# Patient Record
Sex: MALE | Race: OTHER | HISPANIC OR LATINO | URBAN - METROPOLITAN AREA
[De-identification: names, ages, dates, MRNs, and addresses within clinical notes are randomized per-mention and may not be internally consistent; named-entity substitution may affect disease eponyms.]

---

## 2023-05-06 ENCOUNTER — EMERGENCY (EMERGENCY)
Facility: HOSPITAL | Age: 28
LOS: 1 days | Discharge: HOME | End: 2023-05-06
Attending: EMERGENCY MEDICINE | Admitting: EMERGENCY MEDICINE
Payer: SELF-PAY

## 2023-05-06 VITALS
OXYGEN SATURATION: 97 % | RESPIRATION RATE: 16 BRPM | SYSTOLIC BLOOD PRESSURE: 105 MMHG | TEMPERATURE: 98 F | DIASTOLIC BLOOD PRESSURE: 77 MMHG

## 2023-05-06 VITALS
SYSTOLIC BLOOD PRESSURE: 125 MMHG | DIASTOLIC BLOOD PRESSURE: 81 MMHG | RESPIRATION RATE: 16 BRPM | TEMPERATURE: 97 F | HEART RATE: 76 BPM | OXYGEN SATURATION: 94 %

## 2023-05-06 DIAGNOSIS — R41.82 ALTERED MENTAL STATUS, UNSPECIFIED: ICD-10-CM

## 2023-05-06 DIAGNOSIS — F10.129 ALCOHOL ABUSE WITH INTOXICATION, UNSPECIFIED: ICD-10-CM
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
## 2023-05-06 LAB
ALBUMIN SERPL ELPH-MCNC: 4.1 G/DL — SIGNIFICANT CHANGE UP (ref 3.4–5)
ALP SERPL-CCNC: 74 U/L — SIGNIFICANT CHANGE UP (ref 40–120)
ALT FLD-CCNC: 61 U/L — HIGH (ref 12–42)
ANION GAP SERPL CALC-SCNC: 15 MMOL/L — SIGNIFICANT CHANGE UP (ref 9–16)
AST SERPL-CCNC: 48 U/L — HIGH (ref 15–37)
BILIRUB SERPL-MCNC: 0.3 MG/DL — SIGNIFICANT CHANGE UP (ref 0.2–1.2)
BUN SERPL-MCNC: 13 MG/DL — SIGNIFICANT CHANGE UP (ref 7–23)
CALCIUM SERPL-MCNC: 8.5 MG/DL — SIGNIFICANT CHANGE UP (ref 8.5–10.5)
CHLORIDE SERPL-SCNC: 104 MMOL/L — SIGNIFICANT CHANGE UP (ref 96–108)
CO2 SERPL-SCNC: 19 MMOL/L — LOW (ref 22–31)
CREAT SERPL-MCNC: 0.74 MG/DL — SIGNIFICANT CHANGE UP (ref 0.5–1.3)
EGFR: 123 ML/MIN/1.73M2 — SIGNIFICANT CHANGE UP
ETHANOL SERPL-MCNC: 601 MG/DL — HIGH
GLUCOSE SERPL-MCNC: 136 MG/DL — HIGH (ref 70–99)
HCT VFR BLD CALC: 44.7 % — SIGNIFICANT CHANGE UP (ref 39–50)
HGB BLD-MCNC: 15 G/DL — SIGNIFICANT CHANGE UP (ref 13–17)
MAGNESIUM SERPL-MCNC: 2.2 MG/DL — SIGNIFICANT CHANGE UP (ref 1.6–2.6)
MCHC RBC-ENTMCNC: 28.6 PG — SIGNIFICANT CHANGE UP (ref 27–34)
MCHC RBC-ENTMCNC: 33.6 GM/DL — SIGNIFICANT CHANGE UP (ref 32–36)
MCV RBC AUTO: 85.1 FL — SIGNIFICANT CHANGE UP (ref 80–100)
NRBC # BLD: 0 /100 WBCS — SIGNIFICANT CHANGE UP (ref 0–0)
PLATELET # BLD AUTO: 192 K/UL — SIGNIFICANT CHANGE UP (ref 150–400)
POTASSIUM SERPL-MCNC: 3.3 MMOL/L — LOW (ref 3.5–5.3)
POTASSIUM SERPL-SCNC: 3.3 MMOL/L — LOW (ref 3.5–5.3)
PROT SERPL-MCNC: 8.4 G/DL — HIGH (ref 6.4–8.2)
RBC # BLD: 5.25 M/UL — SIGNIFICANT CHANGE UP (ref 4.2–5.8)
RBC # FLD: 12.3 % — SIGNIFICANT CHANGE UP (ref 10.3–14.5)
SODIUM SERPL-SCNC: 138 MMOL/L — SIGNIFICANT CHANGE UP (ref 132–145)
WBC # BLD: 8.24 K/UL — SIGNIFICANT CHANGE UP (ref 3.8–10.5)
WBC # FLD AUTO: 8.24 K/UL — SIGNIFICANT CHANGE UP (ref 3.8–10.5)

## 2023-05-06 PROCEDURE — 99236 HOSP IP/OBS SAME DATE HI 85: CPT

## 2023-05-06 PROCEDURE — 71045 X-RAY EXAM CHEST 1 VIEW: CPT | Mod: 26

## 2023-05-06 PROCEDURE — 70450 CT HEAD/BRAIN W/O DYE: CPT | Mod: 26

## 2023-05-06 PROCEDURE — 99053 MED SERV 10PM-8AM 24 HR FAC: CPT

## 2023-05-06 RX ORDER — SODIUM CHLORIDE 9 MG/ML
1000 INJECTION INTRAMUSCULAR; INTRAVENOUS; SUBCUTANEOUS ONCE
Refills: 0 | Status: COMPLETED | OUTPATIENT
Start: 2023-05-06 | End: 2023-05-06

## 2023-05-06 RX ORDER — METOCLOPRAMIDE HCL 10 MG
10 TABLET ORAL ONCE
Refills: 0 | Status: COMPLETED | OUTPATIENT
Start: 2023-05-06 | End: 2023-05-06

## 2023-05-06 RX ORDER — ONDANSETRON 8 MG/1
4 TABLET, FILM COATED ORAL ONCE
Refills: 0 | Status: COMPLETED | OUTPATIENT
Start: 2023-05-06 | End: 2023-05-06

## 2023-05-06 RX ADMIN — ONDANSETRON 4 MILLIGRAM(S): 8 TABLET, FILM COATED ORAL at 04:53

## 2023-05-06 RX ADMIN — SODIUM CHLORIDE 1000 MILLILITER(S): 9 INJECTION INTRAMUSCULAR; INTRAVENOUS; SUBCUTANEOUS at 05:28

## 2023-05-06 RX ADMIN — Medication 10 MILLIGRAM(S): at 06:45

## 2023-05-06 RX ADMIN — SODIUM CHLORIDE 1000 MILLILITER(S): 9 INJECTION INTRAMUSCULAR; INTRAVENOUS; SUBCUTANEOUS at 04:53

## 2023-05-06 NOTE — ED ADULT TRIAGE NOTE - CHIEF COMPLAINT QUOTE
Pt BIBA, EMS states pt was sleeping outside of [employer] w/ alcohol bottles. Pt unable to follow commands. No obvious injuries noted.

## 2023-05-06 NOTE — ED ADULT NURSE NOTE - OBJECTIVE STATEMENT
Pt came in c/o of ams s/p etoh intoxication. PT received from PAKO Thornton at 0805. Pt sleeping comfortably in bed. On continuous o2/hr monitoring. Bed alarm on. Upon rn assessment, pt has dried blood to the left nostril. Pt currently awake and making needs known. "I need bathroom." Pt provided with a urinal and is using it. Will continue to monitor

## 2023-05-06 NOTE — ED CDU PROVIDER INITIAL DAY NOTE - NSFOLLOWUPINSTRUCTIONS_ED_ALL_ED_FT
Follow up with your primary care doctor or clinics listed below if you do not have a doctor,    [address] [address]  To make an appointment, call [phone]    [facility]  Address: [facility] [place] [address]  Appointment Center: [phone] ([phone])     [facility] is a good referral line for crisis and substance abuse help.  [facility] has drop in programs all over the city.    Return to the ER for Emergencies.  Return immediately for any new or worsening symptoms or any new concerns

## 2023-05-06 NOTE — ED CDU PROVIDER DISPOSITION NOTE - PATIENT PORTAL LINK FT
You can access the FollowMyHealth Patient Portal offered by [facility] by registering at the following website: [email or web address]. By joining [facility]’s FollowMyHealth portal, you will also be able to view your health information using other applications (apps) compatible with our system.

## 2023-05-06 NOTE — ED CDU PROVIDER DISPOSITION NOTE - CLINICAL COURSE
Pt BIBEMS with etoh intox, no trauma, no medical complaints, limited history due to intoxicated state.    ams, possibly 2/2 substance, no trauma noted or reported, protecting airway, will monitor for safety and reassessment for mental status  clinically sober with steady gait clear speech, tolerated PO. No medical complaints, stable for dc home. Pt BIBEMS with etoh intox, no trauma, no medical complaints, limited history due to intoxicated state.    ams, possibly 2/2 substance, no trauma noted or reported, protecting airway, will monitor for safety and reassessment for mental status  clinically sober with steady gait clear speech, tolerated PO. No medical complaints, stable for dc home.  Did not want to take potassium.

## 2023-05-06 NOTE — ED ADULT NURSE NOTE - NSIMPLEMENTINTERV_GEN_ALL_ED
Implemented All Fall Risk Interventions:  [place] to call system. Call bell, personal items and telephone within reach. Instruct patient to call for assistance. Room bathroom lighting operational. Non-slip footwear when patient is off stretcher. Physically safe environment: no spills, clutter or unnecessary equipment. Stretcher in lowest position, wheels locked, appropriate side rails in place. Provide visual cue, wrist band, yellow gown, etc. Monitor gait and stability. Monitor for mental status changes and reorient to person, place, and time. Review medications for side effects contributing to fall risk. Reinforce activity limits and safety measures with patient and family.

## 2023-05-06 NOTE — ED ADULT NURSE REASSESSMENT NOTE - NS ED NURSE REASSESS COMMENT FT1
Pt pulled out his IV. Pt moved closer to the nurses station. On continuous o2/hr monitoring. Bed alarm on.

## 2023-05-06 NOTE — ED PROVIDER NOTE - PHYSICAL EXAMINATION
Gen - WDWN M, +AOB, lethargic, arousable by painful stimuli   Skin - warm, dry, intact  HEENT -  AT/NC, PERRL, clear conjunctiva, pupils 3mm b/l, TM intact with no hemotympanium b/l, no facial contusion or periorbital ecchymosis, o/p clear, uvula midline, airway patent, neck supple with no step off or midline tenderness, FROM  CV - S1S2, R/R/R  Resp - respiration non-labored, CTAB, symmetric bs b/l, no r/r/w  GI - NABS, soft, ND, NT  MS - Moving all extremities, no c/c/e  Neuro - Lethargic, arousable to painful stimuli only

## 2023-05-06 NOTE — ED ADULT NURSE NOTE - CHIEF COMPLAINT QUOTE
Pt BIBA, EMS states pt was sleeping outside of [employer] w/ alcohol bottles. Pt unable to follow commands. No obvious injuries noted.

## 2023-05-06 NOTE — ED CDU PROVIDER INITIAL DAY NOTE - CLINICAL SUMMARY MEDICAL DECISION MAKING FREE TEXT BOX
pt here for public intox, and AMS, no apparent trauma, BAL >600, require close monitoring of hemodynamics and resp status, pending clinical sobriety

## 2023-05-06 NOTE — ED PROVIDER NOTE - NSFOLLOWUPINSTRUCTIONS_ED_ALL_ED_FT
Follow up with your primary care doctor or clinics listed below if you do not have a doctor,    [address] [address]  To make an appointment, call [phone]    [facility]  Address: [facility] [place] [address]  Appointment Center: [phone] ([phone])     [facility] is a good referral line for crisis and substance abuse help.  [facility] has drop in programs all over the city.    Return to the ER for Emergencies.  Return immediately for any new or worsening symptoms or any new concerns

## 2023-05-06 NOTE — ED CDU PROVIDER INITIAL DAY NOTE - OBJECTIVE STATEMENT
30 yo M with unknown PMHx, BIBA for AMS. Per EMS, pt was sleeping outside the [employer] with alcohol bottles. No apparent trauma, bleeding, respiratory distress, N/V, pain, focal weakness, urinary/bowel incontinence or tremors noted. Pt is altered and unable to provide clinical information currently

## 2023-05-06 NOTE — ED CDU PROVIDER DISPOSITION NOTE - NSFOLLOWUPINSTRUCTIONS_ED_ALL_ED_FT
Follow up with your primary care doctor or clinics listed below if you do not have a doctor,    [address] [address]  To make an appointment, call [phone]    [facility]  Address: [facility] [place] [address]  Appointment Center: [phone] ([phone])     [facility] is a good referral line for crisis and substance abuse help.  [facility] has drop in programs all over the city.    Return to the ER for Emergencies.  Return immediately for any new or worsening symptoms or any new concerns

## 2023-05-06 NOTE — ED CDU PROVIDER INITIAL DAY NOTE - PROGRESS NOTE DETAILS
BAL >600, on cardiac monitoring, minimal responsive currently, supportive care, IVF, and close monitoring of respiratory status pending clinical sobriety Patient has been near my work station.  He occasionally awakens to use the bathroom, otherwise sleeping comfortably. Patient has been on obs near my work station.  He occasionally awakens to use the bathroom, otherwise sleeping comfortably.

## 2023-05-06 NOTE — ED ADULT NURSE REASSESSMENT NOTE - NS ED NURSE REASSESS COMMENT FT1
Pt received from [place] MIN. As per previous RN, pt walked out of ED with steady gait, refusing to wait for dc papers. GONZALO Marc aware

## 2023-05-06 NOTE — ED PROVIDER NOTE - ATTENDING APP SHARED VISIT CONTRIBUTION OF CARE
32yo M unknown pmh presents with AMS, found sleeping around etoh bottles.  On exam afebrile, VSS, no e/o head trauma.  etoh level >600.  Plan for close monitoring and reassessment of mental status to sobriety.

## 2023-05-06 NOTE — ED PROVIDER NOTE - NS ED ATTENDING STATEMENT MOD
This was a shared visit with the DEUCE. I reviewed and verified the documentation and independently performed the documented:

## 2023-05-06 NOTE — ED PROVIDER NOTE - OBJECTIVE STATEMENT
32 yo M with unknown PMHx, BIBA for AMS. Per EMS, pt was sleeping outside the [employer] with alcohol bottles. No apparent trauma, bleeding, respiratory distress, N/V, pain, focal weakness, urinary/bowel incontinence or tremors noted. Pt is altered and unable to provide clinical information currently